# Patient Record
(demographics unavailable — no encounter records)

---

## 2025-04-01 NOTE — PHYSICAL EXAM
[Midline] : trachea located in midline position [Normal] : no rashes [FreeTextEntry6] : Significant edema, erythema, and warmth of left pinna [FreeTextEntry8] : whitish debris in EAC with edema.  Debrided under microscope. [de-identified] : erythema and edema

## 2025-04-01 NOTE — CONSULT LETTER
[Dear  ___] : Dear  [unfilled], [Please see my note below.] : Please see my note below. [Sincerely,] : Sincerely, [Consult Letter:] : I had the pleasure of evaluating your patient, [unfilled]. [Consult Closing:] : Thank you very much for allowing me to participate in the care of this patient.  If you have any questions, please do not hesitate to contact me. [FreeTextEntry2] : Ricky Serrano MD  [FreeTextEntry3] : Cailin Cordoba PA-C Department of Otolaryngology [facility] Otolaryngology at [place]   Cristi Fagan MD, FACS Chief of Otolaryngology at [facility]  Dept. of Otolaryngology [facility]  [DrYoni  ___] : Dr. PARIKH

## 2025-04-01 NOTE — PHYSICAL EXAM
[Midline] : trachea located in midline position [Normal] : no rashes [FreeTextEntry6] : Significant edema, erythema, and warmth of left pinna [FreeTextEntry8] : whitish debris in EAC with edema.  Debrided under microscope. [de-identified] : erythema and edema

## 2025-04-01 NOTE — CONSULT LETTER
[Dear  ___] : Dear  [unfilled], [Please see my note below.] : Please see my note below. [Sincerely,] : Sincerely, [Consult Letter:] : I had the pleasure of evaluating your patient, [unfilled]. [Consult Closing:] : Thank you very much for allowing me to participate in the care of this patient.  If you have any questions, please do not hesitate to contact me. [FreeTextEntry2] : Ricky Serrano MD  [FreeTextEntry3] : Cailin Cordoba PA-C Department of Otolaryngology [facility] Otolaryngology at [place]   Cristi Fagan MD, FACS Chief of Otolaryngology at [facility]  Dept. of Otolaryngology [facility]  [DrYoni  ___] : Dr. PARIKH

## 2025-04-01 NOTE — HISTORY OF PRESENT ILLNESS
[de-identified] : 81F presents for right ear infection > 2 weeks. Patient was prescribed Cefuroxime x8 days, Levofloxacin x1 week, and otic ciprofloxacin gtts, and Prednisone x12 days with some but not complete improvement of pain. Last dose of Cipro gtt was this morning. She is taking Tramadol and Tylenol for the pain. She had 1 episode of dizziness. Last ear infection was 1 year ago which resolved with antibiotic drops. Patient denies otorrhea, hearing loss, tinnitus, vertigo.   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

## 2025-04-01 NOTE — ASSESSMENT
[FreeTextEntry1] : Pt's findings consistent with diffuse R OE.  However, the recurrent nature of the problem, coupled with the h/o scleritis, raises the suspicion for Relapsing polychondritis.    Will check ESR and CRP.  Pt to f/u with her Rheumatologist as well.

## 2025-04-01 NOTE — HISTORY OF PRESENT ILLNESS
[de-identified] : 81F presents for right ear infection > 2 weeks. Patient was prescribed Cefuroxime x8 days, Levofloxacin x1 week, and otic ciprofloxacin gtts, and Prednisone x12 days with some but not complete improvement of pain. Last dose of Cipro gtt was this morning. She is taking Tramadol and Tylenol for the pain. She had 1 episode of dizziness. Last ear infection was 1 year ago which resolved with antibiotic drops. Patient denies otorrhea, hearing loss, tinnitus, vertigo.   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

## 2025-05-02 NOTE — PHYSICAL EXAM
[Sclera] : the sclera and conjunctiva were normal [PERRL With Normal Accommodation] : pupils were equal in size, round, and reactive to light [Nasal Cavity] : the nasal mucosa and septum were normal [Respiration, Rhythm And Depth] : normal respiratory rhythm and effort [Auscultation Breath Sounds / Voice Sounds] : lungs were clear to auscultation bilaterally [Heart Rate And Rhythm] : heart rate was normal and rhythm regular [Heart Sounds] : normal S1 and S2 [FreeTextEntry1] : + systolic M [Edema] : there was no peripheral edema [Abdomen Soft] : soft [Abdomen Tenderness] : non-tender [Cervical Lymph Nodes Enlarged Posterior Bilaterally] : posterior cervical [Cervical Lymph Nodes Enlarged Anterior Bilaterally] : anterior cervical [Axillary Lymph Nodes Enlarged Bilaterally] : axillary [Musculoskeletal - Swelling] : no joint swelling seen [] : no rash [No Focal Deficits] : no focal deficits [Oriented To Time, Place, And Person] : oriented to person, place, and time [Impaired Insight] : insight and judgment were intact

## 2025-05-02 NOTE — ASSESSMENT
[FreeTextEntry1] : Patient with right ear chondritis/ no hstory of other episodes of ear chondritis or other sites of chondritis while auricular  chondritis could be an initial manifestation in 40 % of patients but t present patient does not fit criteria for RP = s/p Cefuroxime PO x 8 days, Levofloxacin x1 week with partial response = s/p Prednisone x12 days - partial improvement  Anterior scleritis HIstory of sinusitis Sicca sms History of + CAL high titer, but negative Sjogren's Health maintenance: Quantiferon- negative  = labs = when no multiple sites of cartilage inflammation observed, histological confirmation is required for the diagnosis = will retry AB coarse with Cipro x10 days and fu reponse after = may consider Dapsone if no improvement on AB = ECHO  RTO in 7-10 days

## 2025-05-02 NOTE — CONSULT LETTER
[Dear  ___] : Dear  [unfilled], [Consult Letter:] : I had the pleasure of evaluating your patient, [unfilled]. [Please see my note below.] : Please see my note below. [Consult Closing:] : Thank you very much for allowing me to participate in the care of this patient.  If you have any questions, please do not hesitate to contact me. [Sincerely,] : Sincerely, [FreeTextEntry2] : Cristi Fagan MD [FreeTextEntry3] : Brianna Walters MD Director, [facility],  Rheumatology Division, Department of Medicine ,  [employer]  at [address], [address] [place] [address] Tel: [phone]

## 2025-05-02 NOTE — HISTORY OF PRESENT ILLNESS
[FreeTextEntry1] :  The patient is referred for rheumatological evaluation to r/o relapsing polychondritis  The patient developed right ear infection in mid March, that started with discharge from right ear and then developed redness and swelling of the rightearlobe.  Patient was prescribed Cefuroxime PO x 8 days, Levofloxacin x1 week, and Prednisone x12 days with some but not complete improvement of pain. Later was on otic ciprofloxacin gtts topically, completed > 2 weeks ago No more pain in right ear , but earlobe remain swollen and red.  Has history of dry eyes a for many years Had developed sinus infection in early spring 2022, then left eye redness and swelling and photophobia Was dx with anterior scleritis in 10/2022 - treated with prednisone 30 - 40 mg  with quick taper, no flares since During evaluation in 2022 was found to have + CAL 1: 2560, negative ACE and ANCA [Currently Experiencing] : currently [Fever] : no fever [Chills] : no chills [Skin Lesions] : skin lesions [Oral Ulcers] : no oral ulcers [Cough] : no cough [Dry Mouth] : dry mouth [Chest Pain] : no chest pain [Arthralgias] : no arthralgias [Joint Swelling] : no joint swelling [Morning Stiffness] : no morning stiffness [Muscle Weakness] : no muscle weakness [Visual Changes] : no visual changes [Eye Pain] : no eye pain

## 2025-05-02 NOTE — CONSULT LETTER
[Dear  ___] : Dear  [unfilled], [Consult Letter:] : I had the pleasure of evaluating your patient, [unfilled]. [Please see my note below.] : Please see my note below. [Consult Closing:] : Thank you very much for allowing me to participate in the care of this patient.  If you have any questions, please do not hesitate to contact me. [Sincerely,] : Sincerely, [FreeTextEntry2] : Cristi Fagan MD [FreeTextEntry3] : Brianna Walters MD Director, [facility]culitis [facility] Myositis [facility],  Rheumatology Division, Department of Medicine ,  [employer]  at [address], [address] [place] [address] Tel: [phone]

## 2025-05-13 NOTE — ASSESSMENT
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 HIstory of sinusitis Sicca sms History of + CAL high titer, but negative Sjogren's Health maintenance: Quantiferon- negative  = labs = try Colchicine 0.6 mg qd x 10 days and if no sufficient improvement icrease to 0.6 mg BID  = may consider Dapsone if no improvement - will check G-6PD = ECHO = PET CT to evaluate for extend of the disease and possibly site for biopsy  RTO in 2-3 weeks      The time spent on today's visit included: preparation for the visit with review of the medical records, review of diagnostic studies, examination, orders of diagnostic test, medications. I couselled the patient on the state of patient's condition, treatment plan and prognosis. All questions and concerns were answered and addressed in detail. The patient verbalized understanding and agreed to plan.

## 2025-05-13 NOTE — HISTORY OF PRESENT ILLNESS
[de-identified] : Last was seen in May, 2025 [FreeTextEntry1] : Interval HIstory : =============== left earlobe erdness and pain has resolved, after 7 days of Cipro, but still has swelling no fever or chills no eye pain

## 2025-05-13 NOTE — PHYSICAL EXAM
[Sclera] : the sclera and conjunctiva were normal [PERRL With Normal Accommodation] : pupils were equal in size, round, and reactive to light [Nasal Cavity] : the nasal mucosa and septum were normal [Respiration, Rhythm And Depth] : normal respiratory rhythm and effort [Auscultation Breath Sounds / Voice Sounds] : lungs were clear to auscultation bilaterally [Heart Rate And Rhythm] : heart rate was normal and rhythm regular [Heart Sounds] : normal S1 and S2 [Edema] : there was no peripheral edema [Abdomen Soft] : soft [Abdomen Tenderness] : non-tender [Cervical Lymph Nodes Enlarged Posterior Bilaterally] : posterior cervical [Cervical Lymph Nodes Enlarged Anterior Bilaterally] : anterior cervical [Axillary Lymph Nodes Enlarged Bilaterally] : axillary [Musculoskeletal - Swelling] : no joint swelling seen [] : no rash [No Focal Deficits] : no focal deficits [Oriented To Time, Place, And Person] : oriented to person, place, and time [Impaired Insight] : insight and judgment were intact [FreeTextEntry1] : + systolic M

## 2025-05-13 NOTE — REVIEW OF SYSTEMS
[Dry Eyes] : dryness of the eyes [As Noted in HPI] : as noted in HPI [Negative] : Endocrine [Fever] : no fever [Chills] : no chills [Red Eyes] : eyes not red

## 2025-05-13 NOTE — HISTORY OF PRESENT ILLNESS
[de-identified] : Last was seen in May, 2025 [FreeTextEntry1] : Interval HIstory : =============== left earlobe erdness and pain has resolved, after 7 days of Cipro, but still has swelling no fever or chills no eye pain

## 2025-05-28 NOTE — CONSULT LETTER
[Dear  ___] : Dear  [unfilled], [Consult Letter:] : I had the pleasure of evaluating your patient, [unfilled]. [FreeTextEntry2] : Dr Serrano [phone] pcp

## 2025-05-28 NOTE — HISTORY OF PRESENT ILLNESS
[de-identified] : Last was seen in 2 weeks ago [FreeTextEntry1] : Interval HIstory : =============== left earlobe terdness and pain almost resolved, with Colchicine 0.6 mg qd no eye pain

## 2025-05-28 NOTE — HISTORY OF PRESENT ILLNESS
[de-identified] : Last was seen in 2 weeks ago [FreeTextEntry1] : Interval HIstory : =============== left earlobe terdness and pain almost resolved, with Colchicine 0.6 mg qd no eye pain

## 2025-05-28 NOTE — CONSULT LETTER
[Dear  ___] : Dear  [unfilled], [Consult Letter:] : I had the pleasure of evaluating your patient, [unfilled]. [FreeTextEntry2] : Dr Serrano [phone] pcp

## 2025-05-28 NOTE — ASSESSMENT
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 HIstory of sinusitis Sicca sms History of + CAL high titer, but negative Sjogren's Few small FDG-avid bilateral cervical lymph nodes, more numerous on the right, are nonspecific. For reference, a right level 5 node measures 1.0 x 0.6 cm, SUV Health maintenance: Quantiferon- negative # r/o Empty cellar syndrome with photopenia in suprasellar region, centered in the left of midline on PET CT - ? incidental finding  # Nonspecific hypermetabolism in gastric antrum.  on PET CT - not symptomatic   = labs = increase Colchicine 0.6 mg BID  = ECHO = may need further evaluation for Empty cellar syndrome due to photopenia in suprasellar region, centered in the left of midline on PET CT - ? incidental finding - d/w PCP = GI evaluation  RTO in 1 month     The time spent on today's visit included: preparation for the visit with review of the medical records, review of diagnostic studies, examination, orders of diagnostic test, medications. I couselled the patient on the state of patient's condition, treatment plan and prognosis. All questions and concerns were answered and addressed in detail. The patient verbalized understanding and agreed to plan.

## 2025-07-01 NOTE — HISTORY OF PRESENT ILLNESS
[de-identified] : Last was seen in May, 2025 [FreeTextEntry1] : Interval HIstory : =============== left earlobe terdness and pain  resolved, now on Colchicine 0.6 mg qd no eye pain no fever, no chills ,no night sweats had infection and was on AB- completed few days ago

## 2025-07-01 NOTE — ASSESSMENT
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 # Abnormal PET CT [] Few small FDG-avid bilateral cervical lymph nodes, more numerous on the right, are nonspecific. For reference, a right level 5 node measures 1.0 x 0.6 cm, SUV Health maintenance: Quantiferon- negative [] r/o Empty cellar syndrome with photopenia in suprasellar region, centered in the left of midline on PET CT - ? incidental finding  [] Nonspecific hypermetabolism in gastric antrum.  on PET CT - not symptomatic   = labs = continue Colchicine 0.6 mg QD  = hem-onc evaluation - ? need for bone marrow  = may need further evaluation for Empty cellar syndrome due to photopenia in suprasellar region, centered in the left of midline on PET CT - ? incidental finding - d/w PCP = ECHO  RTO in 1 month     The time spent on today's visit included: preparation for the visit with review of the medical records, review of diagnostic studies, examination, orders of diagnostic test, medications. I couselled the patient on the state of patient's condition, treatment plan and prognosis. All questions and concerns were answered and addressed in detail. The patient verbalized understanding and agreed to plan.

## 2025-07-01 NOTE — HISTORY OF PRESENT ILLNESS
[de-identified] : Last was seen in May, 2025 [FreeTextEntry1] : Interval HIstory : =============== left earlobe terdness and pain  resolved, now on Colchicine 0.6 mg qd no eye pain no fever, no chills ,no night sweats had infection and was on AB- completed few days ago

## 2025-07-01 NOTE — CONSULT LETTER
[Dear  ___] : Dear  [unfilled], [Consult Letter:] : I had the pleasure of evaluating your patient, [unfilled]. [FreeTextEntry2] : Dr Serrano [phone] pcp

## 2025-07-01 NOTE — CONSULT LETTER
[Dear  ___] : Dear  [unfilled], [Consult Letter:] : I had the pleasure of evaluating your patient, [unfilled]. [FreeTextEntry2] : Dr Serrano [phone] pcp